# Patient Record
Sex: MALE | Race: WHITE | Employment: OTHER | URBAN - METROPOLITAN AREA
[De-identification: names, ages, dates, MRNs, and addresses within clinical notes are randomized per-mention and may not be internally consistent; named-entity substitution may affect disease eponyms.]

---

## 2024-05-09 ENCOUNTER — OFFICE VISIT (OUTPATIENT)
Dept: FAMILY MEDICINE CLINIC | Age: 69
End: 2024-05-09
Payer: MEDICARE

## 2024-05-09 VITALS
WEIGHT: 168 LBS | SYSTOLIC BLOOD PRESSURE: 121 MMHG | TEMPERATURE: 98 F | HEART RATE: 81 BPM | RESPIRATION RATE: 18 BRPM | OXYGEN SATURATION: 94 % | BODY MASS INDEX: 27 KG/M2 | DIASTOLIC BLOOD PRESSURE: 57 MMHG | HEIGHT: 66 IN

## 2024-05-09 DIAGNOSIS — J44.9 CHRONIC OBSTRUCTIVE PULMONARY DISEASE, UNSPECIFIED COPD TYPE (HCC): ICD-10-CM

## 2024-05-09 DIAGNOSIS — Z85.01 HISTORY OF ESOPHAGEAL CANCER: Primary | ICD-10-CM

## 2024-05-09 LAB
ALBUMIN: 4.5 G/DL (ref 3.5–5.2)
ALP BLD-CCNC: 80 U/L (ref 40–129)
ALT SERPL-CCNC: 13 U/L (ref 0–40)
ANION GAP SERPL CALCULATED.3IONS-SCNC: 10 MMOL/L (ref 7–16)
AST SERPL-CCNC: 20 U/L (ref 0–39)
BILIRUB SERPL-MCNC: 0.2 MG/DL (ref 0–1.2)
BUN BLDV-MCNC: 18 MG/DL (ref 6–23)
CALCIUM SERPL-MCNC: 9.6 MG/DL (ref 8.6–10.2)
CHLORIDE BLD-SCNC: 102 MMOL/L (ref 98–107)
CO2: 28 MMOL/L (ref 22–29)
CREAT SERPL-MCNC: 1 MG/DL (ref 0.7–1.2)
GFR, ESTIMATED: 86 ML/MIN/1.73M2
GLUCOSE BLD-MCNC: 83 MG/DL (ref 74–99)
HCT VFR BLD CALC: 42.4 % (ref 37–54)
HEMOGLOBIN: 13.5 G/DL (ref 12.5–16.5)
MCH RBC QN AUTO: 31.1 PG (ref 26–35)
MCHC RBC AUTO-ENTMCNC: 31.8 G/DL (ref 32–34.5)
MCV RBC AUTO: 97.7 FL (ref 80–99.9)
PDW BLD-RTO: 13.1 % (ref 11.5–15)
PLATELET # BLD: 271 K/UL (ref 130–450)
PMV BLD AUTO: 11.4 FL (ref 7–12)
POTASSIUM SERPL-SCNC: 4.7 MMOL/L (ref 3.5–5)
RBC # BLD: 4.34 M/UL (ref 3.8–5.8)
SODIUM BLD-SCNC: 140 MMOL/L (ref 132–146)
TOTAL PROTEIN: 7 G/DL (ref 6.4–8.3)
WBC # BLD: 8.1 K/UL (ref 4.5–11.5)

## 2024-05-09 PROCEDURE — 1123F ACP DISCUSS/DSCN MKR DOCD: CPT

## 2024-05-09 PROCEDURE — 99213 OFFICE O/P EST LOW 20 MIN: CPT

## 2024-05-09 RX ORDER — LANOLIN ALCOHOL/MO/W.PET/CERES
1000 CREAM (GRAM) TOPICAL DAILY
COMMUNITY

## 2024-05-09 RX ORDER — TIOTROPIUM BROMIDE 18 UG/1
18 CAPSULE ORAL; RESPIRATORY (INHALATION) DAILY
Qty: 90 CAPSULE | Refills: 1 | Status: SHIPPED | OUTPATIENT
Start: 2024-05-09

## 2024-05-09 RX ORDER — OMEPRAZOLE 20 MG/1
20 CAPSULE, DELAYED RELEASE ORAL
Qty: 30 CAPSULE | Refills: 0 | Status: SHIPPED | OUTPATIENT
Start: 2024-05-09

## 2024-05-09 SDOH — ECONOMIC STABILITY: FOOD INSECURITY: WITHIN THE PAST 12 MONTHS, THE FOOD YOU BOUGHT JUST DIDN'T LAST AND YOU DIDN'T HAVE MONEY TO GET MORE.: NEVER TRUE

## 2024-05-09 SDOH — ECONOMIC STABILITY: HOUSING INSECURITY
IN THE LAST 12 MONTHS, WAS THERE A TIME WHEN YOU DID NOT HAVE A STEADY PLACE TO SLEEP OR SLEPT IN A SHELTER (INCLUDING NOW)?: NO

## 2024-05-09 SDOH — ECONOMIC STABILITY: INCOME INSECURITY: HOW HARD IS IT FOR YOU TO PAY FOR THE VERY BASICS LIKE FOOD, HOUSING, MEDICAL CARE, AND HEATING?: NOT VERY HARD

## 2024-05-09 SDOH — ECONOMIC STABILITY: FOOD INSECURITY: WITHIN THE PAST 12 MONTHS, YOU WORRIED THAT YOUR FOOD WOULD RUN OUT BEFORE YOU GOT MONEY TO BUY MORE.: NEVER TRUE

## 2024-05-09 ASSESSMENT — PATIENT HEALTH QUESTIONNAIRE - PHQ9
SUM OF ALL RESPONSES TO PHQ QUESTIONS 1-9: 1
SUM OF ALL RESPONSES TO PHQ QUESTIONS 1-9: 1
SUM OF ALL RESPONSES TO PHQ9 QUESTIONS 1 & 2: 1
1. LITTLE INTEREST OR PLEASURE IN DOING THINGS: SEVERAL DAYS
SUM OF ALL RESPONSES TO PHQ QUESTIONS 1-9: 1
2. FEELING DOWN, DEPRESSED OR HOPELESS: NOT AT ALL
SUM OF ALL RESPONSES TO PHQ QUESTIONS 1-9: 1

## 2024-05-09 NOTE — PROGRESS NOTES
[facility]  FAMILY MEDICINE RESIDENCY PROGRAM  DATE OF VISIT : 2024    Patient : Juwan Diallo   Age : 68 y.o.    : 1955   MRN : [id number]   ________________________________________________________________    Chief Complaint:   Chief Complaint   Patient presents with    Establish Care    COPD     Needs refills on inhalers and a order for a nebulizer with supplies     Abdominal Pain     With loose stools   Cancer free in 2017       HPI:   History obtained from the patient. Juwan Diallo is a 68 y.o. male here for follow up on    PMH of esophageal cancer in . Finished chemotherapy. Oncology from [facility].   Has been planning to reach out to them again to set up follow up care with them,    Hx of COPD   Has been using duoneb 3 times per day.  Had PFTs done by previous pcp  Has never been on any other inhalers   Quit smoking about a year and a half ago  Had been smoking for about 50 years a pack a day   States that he has obtained PFTs in the past with previous PCP    Abdominal pain/ Hx of Esophageal cancer.  States that he has been having Loose stools intermittently for the past 6 months.   States that avoiding sugars and alcohols helps with stool formations   Discomfort and pain on lower abdomen area and feeling bloated as well.  States that he hx of GERD but not on medications,   States he previously Had an EGD - Was Diagnosed with barrets dx and esophageal cancer in [place]. He was recommended prilosec but patient didn't want to do it..   Was also referred to Oncology which he was following up with over at [place].      I reviewed the patient's past medications, allergies, past medical history, past surgical history, family history and social history during this visit      Physical Exam:    Vitals: BP (!) 121/57 (Site: Left Upper Arm, Position: Sitting, Cuff Size: Large Adult)   Pulse 81   Temp 98 °F (36.7 °C) (Temporal)   Resp 18   Ht 1.676 m (5' 6\")   Wt 76.2 kg (168 lb)

## 2024-05-09 NOTE — PROGRESS NOTES
Attending Physician Statement    S:   Chief Complaint   Patient presents with    Establish Care    COPD     Needs refills on inhalers and a order for a nebulizer with supplies     Abdominal Pain     With loose stools   Cancer free in 2017      PMH of esophageal cancer, COPD.  Finished chemo in 2016 through [facility].      Becoming increasingly dyspneic.  Had PFT's in past.  Quit smoking one year ago.  On albuterol   Abdomen pain - bloating, lower abdominal pain for 6 months.  Loose stools, some relation to alcohol and sugar.    O: Blood pressure (!) 121/57, pulse 81, temperature 98 °F (36.7 °C), temperature source Temporal, resp. rate 18, height 1.676 m (5' 6\"), weight 76.2 kg (168 lb), SpO2 94 %.   Exam:   Heart - RRR   Lungs - decreased air movement   Abdomen- soft, tender lower abdomen  A: As above  P:  Begin spiriva   Old records   Trial of PPI   F/U with oncology   Labs   Refer to surgery for probable EGD, colonoscopy   CT of chest, abdomen   Follow-up as ordered    I have discussed the case, including pertinent history and exam findings with the resident. I agree with the documented assessment and plan.    Ion Malone MD

## 2024-05-11 ENCOUNTER — HOSPITAL ENCOUNTER (OUTPATIENT)
Age: 69
End: 2024-05-11
Payer: MEDICARE

## 2024-05-11 ENCOUNTER — HOSPITAL ENCOUNTER (OUTPATIENT)
Dept: GENERAL RADIOLOGY | Age: 69
End: 2024-05-11
Payer: MEDICARE

## 2024-05-11 DIAGNOSIS — Z85.01 HISTORY OF ESOPHAGEAL CANCER: ICD-10-CM

## 2024-05-11 PROCEDURE — 74019 RADEX ABDOMEN 2 VIEWS: CPT

## 2024-05-11 PROCEDURE — 71046 X-RAY EXAM CHEST 2 VIEWS: CPT

## 2024-05-13 DIAGNOSIS — Z85.01 HISTORY OF ESOPHAGEAL CANCER: Primary | ICD-10-CM

## 2024-05-29 DIAGNOSIS — J44.9 CHRONIC OBSTRUCTIVE PULMONARY DISEASE, UNSPECIFIED COPD TYPE (HCC): ICD-10-CM

## 2024-05-29 RX ORDER — TIOTROPIUM BROMIDE 18 UG/1
18 CAPSULE ORAL; RESPIRATORY (INHALATION) DAILY
Qty: 90 CAPSULE | Refills: 1 | Status: SHIPPED | OUTPATIENT
Start: 2024-05-29

## 2024-05-30 ENCOUNTER — HOSPITAL ENCOUNTER (OUTPATIENT)
Dept: CT IMAGING | Age: 69
Discharge: HOME | End: 2024-06-01
Payer: MEDICARE

## 2024-05-30 DIAGNOSIS — Z85.01 HISTORY OF ESOPHAGEAL CANCER: ICD-10-CM

## 2024-05-30 PROCEDURE — 6360000004 HC RX CONTRAST MEDICATION: Performed by: RADIOLOGY

## 2024-05-30 PROCEDURE — 74178 CT ABD&PLV WO CNTR FLWD CNTR: CPT

## 2024-05-30 RX ADMIN — IOPAMIDOL 75 ML: 755 INJECTION, SOLUTION INTRAVENOUS at 16:34

## 2024-06-03 ENCOUNTER — OFFICE VISIT (OUTPATIENT)
Dept: FAMILY MEDICINE CLINIC | Age: 69
End: 2024-06-03
Payer: MEDICARE

## 2024-06-03 VITALS
DIASTOLIC BLOOD PRESSURE: 62 MMHG | HEIGHT: 66 IN | RESPIRATION RATE: 18 BRPM | SYSTOLIC BLOOD PRESSURE: 123 MMHG | TEMPERATURE: 98.2 F | OXYGEN SATURATION: 76 % | WEIGHT: 168 LBS | HEART RATE: 76 BPM | BODY MASS INDEX: 27 KG/M2

## 2024-06-03 DIAGNOSIS — E78.5 HYPERLIPIDEMIA, UNSPECIFIED HYPERLIPIDEMIA TYPE: ICD-10-CM

## 2024-06-03 DIAGNOSIS — J44.9 CHRONIC OBSTRUCTIVE PULMONARY DISEASE, UNSPECIFIED COPD TYPE (HCC): Primary | ICD-10-CM

## 2024-06-03 DIAGNOSIS — Z11.59 NEED FOR HEPATITIS C SCREENING TEST: ICD-10-CM

## 2024-06-03 LAB
CHOLESTEROL, TOTAL: 185 MG/DL
HDLC SERPL-MCNC: 77 MG/DL
LDL CHOLESTEROL: 96 MG/DL
TRIGL SERPL-MCNC: 59 MG/DL
VLDLC SERPL CALC-MCNC: 12 MG/DL

## 2024-06-03 PROCEDURE — G8419 CALC BMI OUT NRM PARAM NOF/U: HCPCS

## 2024-06-03 PROCEDURE — 3017F COLORECTAL CA SCREEN DOC REV: CPT

## 2024-06-03 PROCEDURE — 1036F TOBACCO NON-USER: CPT

## 2024-06-03 PROCEDURE — 3023F SPIROM DOC REV: CPT

## 2024-06-03 PROCEDURE — 36415 COLL VENOUS BLD VENIPUNCTURE: CPT | Performed by: FAMILY MEDICINE

## 2024-06-03 PROCEDURE — G8428 CUR MEDS NOT DOCUMENT: HCPCS

## 2024-06-03 PROCEDURE — 1123F ACP DISCUSS/DSCN MKR DOCD: CPT

## 2024-06-03 PROCEDURE — 99213 OFFICE O/P EST LOW 20 MIN: CPT

## 2024-06-03 RX ORDER — TIOTROPIUM BROMIDE INHALATION SPRAY 1.56 UG/1
2 SPRAY, METERED RESPIRATORY (INHALATION) DAILY
Qty: 4 EACH | Refills: 1 | Status: SHIPPED | OUTPATIENT
Start: 2024-06-03 | End: 2024-08-02

## 2024-06-03 RX ORDER — TIOTROPIUM BROMIDE INHALATION SPRAY 1.56 UG/1
2 SPRAY, METERED RESPIRATORY (INHALATION) DAILY
Qty: 4 G | Refills: 1 | Status: CANCELLED | OUTPATIENT
Start: 2024-06-03 | End: 2024-08-02

## 2024-06-03 RX ORDER — IPRATROPIUM BROMIDE AND ALBUTEROL SULFATE 2.5; .5 MG/3ML; MG/3ML
1 SOLUTION RESPIRATORY (INHALATION) EVERY 4 HOURS
Qty: 360 ML | Refills: 2 | Status: CANCELLED | OUTPATIENT
Start: 2024-06-03

## 2024-06-03 RX ORDER — IPRATROPIUM BROMIDE AND ALBUTEROL SULFATE 2.5; .5 MG/3ML; MG/3ML
1 SOLUTION RESPIRATORY (INHALATION) EVERY 4 HOURS
Qty: 360 ML | Refills: 1 | Status: SHIPPED | OUTPATIENT
Start: 2024-06-03

## 2024-06-03 NOTE — PROGRESS NOTES
[facility]  FAMILY MEDICINE RESIDENCY PROGRAM  DATE OF VISIT : 6/3/2024    Patient : Juwan Diallo   Age : 68 y.o.    : 1955   MRN : [id number]   ________________________________________________________________    Chief Complaint:   Chief Complaint   Patient presents with    1 Month Follow-Up     Patient presents today for a 4 week follow up. Patient would like to go CT results.     Discuss Medications     Patient states he would like to discuss his med rx for his Duo Neb and Spiriva and a rescue inhaler. Also would like to continue Epinephrine.        HPI:   History obtained from the patient. Juwan Diallo is a 68 y.o. male here for follow up on      Hx of COPD   Had PFTs done by previous pcp  Has never been on any other inhalers   Quit smoking about a year and a half ago  Had been smoking for about 50 years a pack a day   States that he has obtained PFTs in the past with previous PCP  Started on Spiriva on previous visit, reports not being able to obtain it.       Abdominal pain/ Hx of Esophageal cancer.  States that he has been having Loose stools intermittently for the past 6 months.   States that avoiding sugars and alcohols helps with stool formations   Discomfort and pain on lower abdomen area and feeling bloated as well.  States that he hx of GERD but not on medications,   States he previously Had an EGD - Was Diagnosed with barrets dx and esophageal cancer in [place]. He was recommended prilosec but patient didn't want to do it..   Was also referred to Oncology which he was following up with over at [place].  PMH of esophageal cancer in 2016. Finished chemotherapy. Oncology from [facility].   Has been planning to reach out to them again to set up follow up care with them,  CT abdomen and  pelvis 2024 no signs of metastatic disease.   Referred to Gen surgery for  EGD/Colonoscopy  Started on PPI last visit., reports mild improvement on symptoms.   No blood in stools    I

## 2024-06-03 NOTE — PROGRESS NOTES
S: 68 y.o. male here for copd, weight loss (h/o esophageal CA).   Didn't get spiriva yet. Plans to  Didn't get EGD/cscope yet. Plans to  CTAP reviewed w/ pt today. Labs not concerning.    O: VS: /62 (Site: Left Upper Arm, Position: Sitting, Cuff Size: Medium Adult)   Pulse 76   Temp 98.2 °F (36.8 °C) (Temporal)   Resp 18   Ht 1.676 m (5' 6\")   Wt 76.2 kg (168 lb)   SpO2 (!) 76%   BMI 27.12 kg/m²    General: NAD, alert and interacting appropriately.    CV:  RRR, no gallops, rubs, or murmurs    Resp: CTAB   Ext:  No edema    Impression: copd, weight loss (h/o esophageal CA).   Plan:   See [facility] Onc and Gen Surg asap  Start spiriva  Hep c, lipids  Rtc 1 mo    Attending Physician Statement  I have discussed the case, including pertinent history and exam findings with the resident.  I agree with the documented assessment and plan.

## 2024-06-04 LAB — HEPATITIS C ANTIBODY: NONREACTIVE

## 2024-09-19 ENCOUNTER — TELEPHONE (OUTPATIENT)
Dept: SURGERY | Age: 69
End: 2024-09-19

## 2024-09-19 NOTE — TELEPHONE ENCOUNTER
FYI:   Patient cancelled his 9/30 new patient appointment.  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  He will check back after the first of the year.

## 2024-12-16 ENCOUNTER — OFFICE VISIT (OUTPATIENT)
Dept: FAMILY MEDICINE CLINIC | Age: 69
End: 2024-12-16

## 2024-12-16 VITALS
HEART RATE: 73 BPM | HEIGHT: 66 IN | SYSTOLIC BLOOD PRESSURE: 130 MMHG | OXYGEN SATURATION: 95 % | WEIGHT: 175 LBS | RESPIRATION RATE: 20 BRPM | DIASTOLIC BLOOD PRESSURE: 70 MMHG | TEMPERATURE: 99.3 F | BODY MASS INDEX: 28.12 KG/M2

## 2024-12-16 DIAGNOSIS — C15.9 MALIGNANT NEOPLASM OF ESOPHAGUS, UNSPECIFIED LOCATION (HCC): ICD-10-CM

## 2024-12-16 DIAGNOSIS — J43.9 PULMONARY EMPHYSEMA, UNSPECIFIED EMPHYSEMA TYPE (HCC): ICD-10-CM

## 2024-12-16 DIAGNOSIS — J44.9 CHRONIC OBSTRUCTIVE PULMONARY DISEASE, UNSPECIFIED COPD TYPE (HCC): ICD-10-CM

## 2024-12-16 DIAGNOSIS — L40.9 PSORIASIS: Primary | ICD-10-CM

## 2024-12-16 RX ORDER — FLUTICASONE PROPIONATE 110 UG/1
2 AEROSOL, METERED RESPIRATORY (INHALATION) 2 TIMES DAILY
Qty: 12 G | Refills: 3 | Status: CANCELLED | OUTPATIENT
Start: 2024-12-16 | End: 2025-12-16

## 2024-12-16 RX ORDER — TIOTROPIUM BROMIDE INHALATION SPRAY 1.56 UG/1
2 SPRAY, METERED RESPIRATORY (INHALATION) DAILY
Qty: 4 EACH | Refills: 1 | Status: SHIPPED | OUTPATIENT
Start: 2024-12-16 | End: 2025-02-14

## 2024-12-16 RX ORDER — TRIAMCINOLONE ACETONIDE 0.25 MG/G
OINTMENT TOPICAL
Qty: 15 G | Refills: 1 | Status: SHIPPED | OUTPATIENT
Start: 2024-12-16

## 2024-12-16 ASSESSMENT — ENCOUNTER SYMPTOMS
GASTROINTESTINAL NEGATIVE: 1
RESPIRATORY NEGATIVE: 1

## 2024-12-16 NOTE — PROGRESS NOTES
S: 69 y.o. male presents today for Rash (BILATERAL LES  AND FOREARMS ) and Established New Doctor      NTP    New rash: 3-4 months L leg and extended to R leg and extremity; tender and pruritic    COPD; quit smoking 2 years; ago; uses OTC mist    O: VS: /70   Pulse 73   Temp 99.3 °F (37.4 °C) (Temporal)   Resp 20   Ht 1.676 m (5' 6\")   Wt 79.4 kg (175 lb)   SpO2 95%   BMI 28.25 kg/m²   AAO/NAD, appropriate affect for mood  CV:  RRR, no murmur  Resp: CTAB  Skin: see media    Assessment/Plan:   1) likely psoriasis - consider strep testing; steroid cream; referral to derm  2) COPD - resend inhalers; spiriva and albuterol for now  RTO: 2 week f/u rash      Attending Physician Statement  I have discussed the case, including pertinent history and exam findings with the resident.  I agree with the documented assessment and plan.      Electronically signed by Fran Marion MD on 12/16/2024 at 3:32 PM

## 2024-12-16 NOTE — PROGRESS NOTES
[facility]  FAMILY MEDICINE RESIDENCY PROGRAM  DATE OF VISIT : 2024    Patient : Juwan Diallo   Age : 69 y.o.    : 1955   MRN : [id number]   ______________________________________________________________________    Chief Complaint:   Chief Complaint   Patient presents with    Rash     BILATERAL LES  AND FOREARMS     Established New Doctor       HPI:   History obtained from the patient.   Juwan Diallo is a 69 y.o. male who  has a past medical history of Cancer and COPD presents to the clinic for rash.     Patient states that rash that started 3-4 months ago on the left leg. Has now advanced to his right leg as wells as hands. It is grey in color, pruritic and tender only to touch. It is associated with redness and edema of lower extremity. Denies mucous membrane involvement. Tried using rubbing alcohol but was not helpful. Denies fever, chills, weakness. Did not start any new medications prior to onset. Only allergic to penicillin. He is not sexually active and has no hx of blood tranfusionsn.    COPD: Is not currently on prescription inhalers and tx due to lack of insurance coverage. He currently uses OTC inhaler called Primatene mist. Has reaction to formeterol - had tremors and severe chest pain with them. Denies worsening SOB, CP, wheezing, PND, Orthopnea. Patient quit smoking 2 years ago.    Past Medical History:  Past Medical History:   Diagnosis Date    Cancer (HCC)     esophageal    COPD (chronic obstructive pulmonary disease) (HCC)        Past Surgical History:  Past Surgical History:   Procedure Laterality Date    PILONIDAL CYST EXCISION      TUNNELED VENOUS PORT PLACEMENT         Family History:  No family history on file.    Social History:  Social History     Socioeconomic History    Marital status: Single   Tobacco Use    Smoking status: Former     Current packs/day: 2.00     Types: Cigarettes     Passive exposure: Past    Smokeless tobacco: Never   Vaping Use    Vaping

## 2024-12-18 ENCOUNTER — HOSPITAL ENCOUNTER (OUTPATIENT)
Age: 69
Discharge: HOME | End: 2024-12-18
Payer: MEDICARE

## 2024-12-18 DIAGNOSIS — L40.9 PSORIASIS: ICD-10-CM

## 2024-12-18 LAB — ASO AB SERPL-ACNC: 45 IU/ML (ref 0–200)

## 2024-12-18 PROCEDURE — 36415 COLL VENOUS BLD VENIPUNCTURE: CPT

## 2024-12-18 PROCEDURE — 86592 SYPHILIS TEST NON-TREP QUAL: CPT

## 2024-12-18 PROCEDURE — 87389 HIV-1 AG W/HIV-1&-2 AB AG IA: CPT

## 2024-12-18 PROCEDURE — 86215 DEOXYRIBONUCLEASE ANTIBODY: CPT

## 2024-12-18 PROCEDURE — 86063 ANTISTREPTOLYSIN O SCREEN: CPT

## 2024-12-19 LAB
HIV 1+2 AB+HIV1 P24 AG SERPL QL IA: NONREACTIVE
RPR SER QL: NONREACTIVE

## 2024-12-23 LAB — STREP DNASE B SER-ACNC: 92 U/ML

## 2025-01-09 ENCOUNTER — OFFICE VISIT (OUTPATIENT)
Dept: FAMILY MEDICINE CLINIC | Age: 70
End: 2025-01-09

## 2025-01-09 VITALS
OXYGEN SATURATION: 94 % | RESPIRATION RATE: 18 BRPM | BODY MASS INDEX: 28.28 KG/M2 | SYSTOLIC BLOOD PRESSURE: 125 MMHG | DIASTOLIC BLOOD PRESSURE: 75 MMHG | WEIGHT: 176 LBS | TEMPERATURE: 98.8 F | HEART RATE: 92 BPM | HEIGHT: 66 IN

## 2025-01-09 DIAGNOSIS — L40.4 GUTTATE PSORIASIS: ICD-10-CM

## 2025-01-09 DIAGNOSIS — J44.9 CHRONIC OBSTRUCTIVE PULMONARY DISEASE, UNSPECIFIED COPD TYPE (HCC): Primary | ICD-10-CM

## 2025-01-09 SDOH — ECONOMIC STABILITY: FOOD INSECURITY: WITHIN THE PAST 12 MONTHS, THE FOOD YOU BOUGHT JUST DIDN'T LAST AND YOU DIDN'T HAVE MONEY TO GET MORE.: NEVER TRUE

## 2025-01-09 SDOH — ECONOMIC STABILITY: FOOD INSECURITY: WITHIN THE PAST 12 MONTHS, YOU WORRIED THAT YOUR FOOD WOULD RUN OUT BEFORE YOU GOT MONEY TO BUY MORE.: NEVER TRUE

## 2025-01-09 ASSESSMENT — PATIENT HEALTH QUESTIONNAIRE - PHQ9
SUM OF ALL RESPONSES TO PHQ QUESTIONS 1-9: 0
SUM OF ALL RESPONSES TO PHQ QUESTIONS 1-9: 0
2. FEELING DOWN, DEPRESSED OR HOPELESS: NOT AT ALL
SUM OF ALL RESPONSES TO PHQ9 QUESTIONS 1 & 2: 0
SUM OF ALL RESPONSES TO PHQ QUESTIONS 1-9: 0
1. LITTLE INTEREST OR PLEASURE IN DOING THINGS: NOT AT ALL
SUM OF ALL RESPONSES TO PHQ QUESTIONS 1-9: 0

## 2025-01-09 NOTE — PROGRESS NOTES
[facility]  FAMILY MEDICINE RESIDENCY PROGRAM  DATE OF VISIT : 1/15/2025    Patient : Juwan Diallo   Age : 69 y.o.    : 1955   MRN : [id number]   ______________________________________________________________________    Chief Complaint:   Chief Complaint   Patient presents with    Follow-up     3 week follow up on psoriasis.        HPI:   History obtained from the patient.   Juwan Diallo is a 69 y.o. male who  has a past medical history of Cancer (HCC) and COPD (chronic obstructive pulmonary disease) ([facility]). presents to the clinic for follow up on Psoriasis.  Patient was seen a month ago for concern for rash. He was started on TSC and referred to dermatology. He has an upcoming appointment with dermatology.     Psoriasis:   Today, Patient reports improvement with rash. Since visit, patient has been rubbing down the rash with alcohol. He also uses listerine and antifungals like lotrimin cream and clomitrazole. Tried using kenalog that was ordered but complains that it had too many instructions. Started using Kenalog about a week ago. Has been using it 1-2x daily. Has an appointment with the dermatologist next week.     COPD:   Does not like spirava. Uses primitine mist and duonebs daily. Uses albuterol as needed. Formeterol causes chest pain and so can't use dulera or symbicort. Has occasional SOB. Has not seen a pulmonologist since .    Past Medical History:  Past Medical History:   Diagnosis Date    Cancer (HCC)     esophageal    COPD (chronic obstructive pulmonary disease) (HCC)        Past Surgical History:  Past Surgical History:   Procedure Laterality Date    PILONIDAL CYST EXCISION      TUNNELED VENOUS PORT PLACEMENT         Family History:  No family history on file.    Social History:  Social History     Socioeconomic History    Marital status: Single     Spouse name: None    Number of children: None    Years of education: None    Highest education level: None   Tobacco Use

## 2025-01-09 NOTE — PROGRESS NOTES
Attending Physician Statement    S:   Chief Complaint   Patient presents with    Follow-up     3 week follow up on psoriasis.       Patient is a 69 year old seen previously for rash. He was started on topical steroids and referred to dermatology. He has been using alcohol swabs, listerine .     Only used topical steroid x 1 week.     He was using primatin mist. He has been able to get albuterol as needed. Has not used spiriva. He does not think it works for him.   O: Blood pressure 125/75, pulse 92, temperature 98.8 °F (37.1 °C), temperature source Temporal, resp. rate 18, height 1.67 m (5' 5.75\"), weight 79.8 kg (176 lb), SpO2 94%.   Exam:   Heart - RRR   Lungs - clear     A: Psoriasis  , copd   P:  Encouraged to stop other treatments and use topical steroid    Order PFTs    Follow-up as ordered    Attending Attestation   I have discussed the case, including pertinent history and exam findings with the resident. I agree with the documented assessment and plan.

## 2025-01-15 ASSESSMENT — ENCOUNTER SYMPTOMS
RESPIRATORY NEGATIVE: 1
GASTROINTESTINAL NEGATIVE: 1